# Patient Record
Sex: FEMALE | Race: WHITE | NOT HISPANIC OR LATINO | Employment: UNEMPLOYED | URBAN - METROPOLITAN AREA
[De-identification: names, ages, dates, MRNs, and addresses within clinical notes are randomized per-mention and may not be internally consistent; named-entity substitution may affect disease eponyms.]

---

## 2017-01-18 ENCOUNTER — TELEPHONE (OUTPATIENT)
Dept: PEDIATRIC CARDIOLOGY | Facility: CLINIC | Age: 37
End: 2017-01-18

## 2017-01-18 NOTE — TELEPHONE ENCOUNTER
Spoke to patient, patient informed i was calling to make sure she was having regular cardiology visits and having her pacemaker checked. Patient informed me that she is seen at the [facility] in [facility]. Patient informed they manage he pacemaker as well. Patient informed i would note the chart

## 2019-10-31 PROBLEM — G62.9 SMALL FIBER NEUROPATHY: Status: ACTIVE | Noted: 2019-10-31

## 2019-10-31 PROBLEM — G43.009 MIGRAINE WITHOUT AURA AND WITHOUT STATUS MIGRAINOSUS, NOT INTRACTABLE: Status: ACTIVE | Noted: 2019-10-31

## 2023-03-17 ENCOUNTER — RESEARCH ENCOUNTER (OUTPATIENT)
Dept: RESEARCH | Facility: HOSPITAL | Age: 43
End: 2023-03-17
Payer: COMMERCIAL

## 2023-03-17 NOTE — PROGRESS NOTES
Date:03/17/2023  Trial: MARCO, 2021.237  PI: Dr. Feldman  Contacted By: Greg Gunderson    Contacted the patient via Phone Call to let them know about the research opportunity MARCO Study. (Congenital Heart Initiative - Redefining Outcomes and Navigation to Adult Centered Care).  This study is looking to improve care for future adult congenital heart defect patients. The trial consists of surveys periodically that the patient completes independently. Study was explained to the patient and all questions were answered regarding to the study.       Left Patient a VM with instruction on how to call back to participate in study.       Please contact MIKA Boen or Dr. Amando Feldman for questions.   Trial Number: [id number]  Trial Email: [email or web address]